# Patient Record
Sex: MALE | Race: BLACK OR AFRICAN AMERICAN
[De-identification: names, ages, dates, MRNs, and addresses within clinical notes are randomized per-mention and may not be internally consistent; named-entity substitution may affect disease eponyms.]

---

## 2020-07-23 ENCOUNTER — HOSPITAL ENCOUNTER (OUTPATIENT)
Dept: HOSPITAL 35 - TBA | Age: 80
Discharge: HOME | End: 2020-07-23
Attending: OPHTHALMOLOGY
Payer: COMMERCIAL

## 2020-07-23 VITALS — SYSTOLIC BLOOD PRESSURE: 145 MMHG | DIASTOLIC BLOOD PRESSURE: 63 MMHG

## 2020-07-23 VITALS — WEIGHT: 217.99 LBS | BODY MASS INDEX: 34.21 KG/M2 | HEIGHT: 67.01 IN

## 2020-07-23 DIAGNOSIS — Z87.891: ICD-10-CM

## 2020-07-23 DIAGNOSIS — Z98.890: ICD-10-CM

## 2020-07-23 DIAGNOSIS — Z11.59: ICD-10-CM

## 2020-07-23 DIAGNOSIS — G47.30: ICD-10-CM

## 2020-07-23 DIAGNOSIS — H05.89: ICD-10-CM

## 2020-07-23 DIAGNOSIS — Z79.899: ICD-10-CM

## 2020-07-23 DIAGNOSIS — E11.9: ICD-10-CM

## 2020-07-23 DIAGNOSIS — D17.0: Primary | ICD-10-CM

## 2020-07-23 PROCEDURE — 50101: CPT

## 2020-07-23 PROCEDURE — 50386 REMOVE STENT VIA TRANSURETH: CPT

## 2020-07-23 PROCEDURE — 62110: CPT

## 2020-07-23 PROCEDURE — 70005: CPT

## 2020-07-23 PROCEDURE — 50398: CPT

## 2020-07-23 PROCEDURE — 51636: CPT

## 2020-07-23 PROCEDURE — 62900: CPT

## 2020-07-23 PROCEDURE — 50010 RENAL EXPLORATION: CPT

## 2020-07-27 NOTE — O
[address] [place]   [address]                     OPERATIVE REPORT              
_______________________________________________________________________________
 
Name:       WILLARD SHEN              Room #:                     DEP [facility]#:      [id number]                       Account #:      [id number]  
Admission:  07/23/20    Attend Phys:    Tapan Davis MD  
Discharge:  07/23/20    Date of Birth:  [birth year]  
                                                          Report #: [id number]
                                                                    [id number]   
_______________________________________________________________________________
THIS REPORT FOR:  
 
cc:  Ciara Rowley MD,Ciara Davis,Tapan CHADWICK MD                                          ~
CC: Raymond Davis
 
DATE OF SERVICE:  07/23/2020
 
 
PREOPERATIVE DIAGNOSIS:  Left orbital mass.
 
POSTOPERATIVE DIAGNOSIS:  Left orbital mass.
 
PROCEDURE:  Left transconjunctival orbitotomy.
 
SURGEON:  Tapan Davis MD.
 
ASSISTANT:  None.
 
ANESTHESIA:  General.
 
COMPLICATIONS:  None.
 
INDICATIONS FOR SURGERY:  This pleasant 79-year-old gentleman has a mass in his
left superotemporal orbit inducing mechanical ectropion with chronic irritation
and discharge from that eye.  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  He
presents today for a right transconjunctival orbitotomy.  Informed consent was
obtained to include but not limited to the potential risk for loss of vision,
bleeding, infection, failure to improve the problem, and the potential need for
further surgery or treatment.
 
DESCRIPTION OF PROCEDURE:  The patient was taken to the operating room where
general anesthesia was administered.  The left orbit was then anesthetized
transconjunctivally with Xylocaine with epinephrine mixed with Marcaine and
Wydase.  An additional aliquot of anesthetic was then administered over the left
lateral canthus to achieve a 7th nerve block.  The patient was subsequently
prepped and draped in the usual sterile fashion.  The left upper lid was then
distracted superiorly allowing access to the superior temporal conjunctivae.  A
radial incision was then made through conjunctiva allowing Tenon to be grasped. 
Tenon's capsule was then elevated and an incision made through the capsule.  The
mass could be grasped through that buttonhole incision.  The dissection was then
 
 
 
[address]   [address]                     OPERATIVE REPORT              
_______________________________________________________________________________
 
Name:       SHENWILLARD              Room #:                     DEP [facility]#:      [id number]                       Account #:      [id number]  
Admission:  07/23/20    Attend Phys:    Tapan Davis MD  
Discharge:  07/23/20    Date of Birth:  [birth year]  
                                                          Report #: [id number]
                                                                    [id number]   
_______________________________________________________________________________
carried down on to the episclera  the lesion from the underlying
globe, the inferiorly based lateral rectus muscle, the superiorly based superior
rectus muscle, and the overlying lacrimal gland.  The dissection was carried
down past the equator.  The lesion was then grasped at its base and clamped with
a hemostat.  A high-temp cautery was then used to amputate the lesion anterior
to the equator.  The stump was grabbed as the hemostat was released to ensure
that good hemostasis was obtained.  Minimal bleeding ensued, which was
controlled with monopolar cautery.  The wound was then closed with 6-0 plain gut
sutures with buried knots.  The wound was then cleaned and dressed with
erythromycin ophthalmic ointment and the patient subsequently transported to the
recovery area having tolerated the procedure well with no anesthetic or
operative complications being noted.
 
 
 
 
 
 
 
 
 
 
 
 
 
 
 
 
 
 
 
 
 
 
 
 
 
 
 
 
 
 
 
 
  <ELECTRONICALLY SIGNED>
   By: Tapan Davis MD          
  07/27/20     0614
D: 07/23/20 0811                           _____________________________________
T: 07/23/20 0826                           Tapan Davis MD            /nt

## 2020-07-28 NOTE — PATH
[facility]
[address]
[place]   [address]                     PATHOLOGY RPT PROCEDURE       
_______________________________________________________________________________
 
Name:       WILLARD SHEN PHLI              Room #:                     DEP [facility] 
M.R.#:      [id number]     Account #:      [id number]  
Admission:  07/23/20    Date of Birth:  [birth year]  
Discharge:  07/23/20                                    Report #:    [id number]
                                                        Path Case #: [id number]
_______________________________________________________________________________
 
[facility] Accession Number: [id number]
.                                                                01
Material submitted:                                        .
eye - LEFT EYE DERMAL LIPOMA. Modifiers: left
.                                                                01
Clinical history:                                          .
Benign neoplasm of orbit
.                                                                02
**********************************************************************
Diagnosis:
Mature adipose tissue, left eye dermal lipoma, excision:
- Compatible with a lipoma showing reactive changes.
.
(IUV:mml; 07/28/2020)
[facility]  07/28/2020  1635 Local
**********************************************************************
.                                                                02
Comment:
Examination shows a few reactive nuclei and mild hyperchromasia. Therefore
a properly-controlled *MDM2 immunohistochemical stain is performed on
block A1 and is shows no nuclear reactivity present supporting the
diagnosis rendered.
.
(IUV:mml; 07/28/2020)
.
.
Professional services performed by [facility] at [facility],
[address] [place] [address] , [place] [address].  *Technical services performed
by [facility], [address] [place] [address], [address], [place] [address].
.                                                                02
Electronically signed:                                     .
Dasia Giron MD, Pathologist
NPI- [id number]
.                                                                01
Gross description:                                         .
The specimen is received in formalin, labeled "Willard Shen, left eye
dermal lipoma" and consists of a segment of yellow orange lobulated
tissue measuring 1.4 x 1.0 x 0.5 cm.  Sectioning reveals homogeneous
yellow cut surfaces and the specimen is entirely submitted in A1.
(SDY; 7/23/2020)
SYU/SYU  07/28/2020  1351 Local
.                                                                02
Pathologist provided ICD-10:
D17.79
.                                                                02
CPT                                                        .
871720, D42708
 
 
[facility]
[address] [place]   [address]                     PATHOLOGY RPT PROCEDURE       
_______________________________________________________________________________
 
Name:       WILLARD SHEN PHIL              Room #:                     [facility] 
M.R.#:      [id number]     Account #:      [id number]  
Admission:  07/23/20    Date of Birth:  [birth year]  
Discharge:  07/23/20                                    Report #:    [id number]
                                                        Path Case #: [id number]
_______________________________________________________________________________
Specimen Comment: A courtesy copy of this report has been sent to [phone]
[phone]
Specimen Comment: [phone], [phone]
Specimen Comment: Report sent to ,DR ANTOINE / DR ZARAGOZA
***Performed at:  01
   [address], [place], [place]  [id number]
   MD Jose Guzman MD Phone:  [phone]
***Performed at:  02
   [address]  [id number]
   MD Dasia Giron MD Phone:  [phone]

## 2022-01-08 ENCOUNTER — HOSPITAL ENCOUNTER (EMERGENCY)
Dept: HOSPITAL 35 - ER | Age: 82
End: 2022-01-08
Payer: COMMERCIAL

## 2022-01-08 ENCOUNTER — HOSPITAL ENCOUNTER (INPATIENT)
Dept: HOSPITAL 35 - SBH | Age: 82
LOS: 3 days | Discharge: HOME | DRG: 885 | End: 2022-01-11
Attending: PSYCHIATRY & NEUROLOGY | Admitting: PSYCHIATRY & NEUROLOGY
Payer: COMMERCIAL

## 2022-01-08 VITALS — HEIGHT: 67 IN | BODY MASS INDEX: 33.74 KG/M2 | WEIGHT: 214.99 LBS

## 2022-01-08 DIAGNOSIS — Z79.82: ICD-10-CM

## 2022-01-08 DIAGNOSIS — Z90.49: ICD-10-CM

## 2022-01-08 DIAGNOSIS — K21.9: ICD-10-CM

## 2022-01-08 DIAGNOSIS — Z87.891: ICD-10-CM

## 2022-01-08 DIAGNOSIS — M54.2: ICD-10-CM

## 2022-01-08 DIAGNOSIS — F32.2: Primary | ICD-10-CM

## 2022-01-08 DIAGNOSIS — E78.00: ICD-10-CM

## 2022-01-08 DIAGNOSIS — Z79.899: ICD-10-CM

## 2022-01-08 DIAGNOSIS — I10: ICD-10-CM

## 2022-01-08 DIAGNOSIS — E11.9: ICD-10-CM

## 2022-01-08 DIAGNOSIS — Z20.822: ICD-10-CM

## 2022-01-08 DIAGNOSIS — Z98.49: ICD-10-CM

## 2022-01-08 DIAGNOSIS — Z79.51: ICD-10-CM

## 2022-01-08 DIAGNOSIS — F32.9: Primary | ICD-10-CM

## 2022-01-08 DIAGNOSIS — G89.29: ICD-10-CM

## 2022-01-08 DIAGNOSIS — I12.9: ICD-10-CM

## 2022-01-08 DIAGNOSIS — E11.22: ICD-10-CM

## 2022-01-08 DIAGNOSIS — N18.9: ICD-10-CM

## 2022-01-08 DIAGNOSIS — R45.851: ICD-10-CM

## 2022-01-08 DIAGNOSIS — N17.9: ICD-10-CM

## 2022-01-08 DIAGNOSIS — N40.0: ICD-10-CM

## 2022-01-08 PROCEDURE — 10880: CPT

## 2022-01-09 VITALS — DIASTOLIC BLOOD PRESSURE: 76 MMHG | SYSTOLIC BLOOD PRESSURE: 138 MMHG

## 2022-01-09 VITALS — SYSTOLIC BLOOD PRESSURE: 137 MMHG | DIASTOLIC BLOOD PRESSURE: 75 MMHG

## 2022-01-09 VITALS — SYSTOLIC BLOOD PRESSURE: 130 MMHG | DIASTOLIC BLOOD PRESSURE: 64 MMHG

## 2022-01-09 VITALS — DIASTOLIC BLOOD PRESSURE: 81 MMHG | SYSTOLIC BLOOD PRESSURE: 145 MMHG

## 2022-01-09 LAB
CHOLEST SERPL-MCNC: 143 MG/DL (ref ?–200)
EST. AVERAGE GLUCOSE BLD GHB EST-MCNC: 186 MG/DL
GLYCOHEMOGLOBIN (HGB A1C): 8.1 % (ref 4.8–5.6)
HDLC SERPL-MCNC: 44 MG/DL (ref 40–?)
LDLC SERPL-MCNC: 69 MG/DL (ref ?–100)
TC:HDL: 3.3 RATIO
TRIGL SERPL-MCNC: 154 MG/DL (ref ?–150)
VLDLC SERPL CALC-MCNC: 31 MG/DL (ref ?–40)

## 2022-01-09 NOTE — NUR
1-9-22 RECEIVED REPORT FROM ED RN BARRIE. PT ARRIVED 0525 AAOX4, VS B/P
145/81, P 80, R 18, T 96.6, 02 % RA RR EVEN AND NONLABORED, LUNGS
CLEAR, HT RR, ABD SOFT/ACTIVE/NONETENDER. PT DENIES SI/HI AND PAIN. PT CALM
AND COOPERATIVE. PT HX HTN, DMII. PT REPORTS THAT HE HAS ALSO HAD THE BOOSTER
FOR COVID. HCP TIM CAN NP CONTACTED. HCP KAYKAY BOB MD CONTACTED AND ORDERS
RECEIVED. PT REQUESTED WE CONTACT HIS WIFE, MESSAGE LEFT FOR ARI THIS AM.

## 2022-01-09 NOTE — NUR
Phone call to patient's daughter, Verónica ([phone]).  Veórnica is
interested in becoming DPOA for her father.  She stated the patient was
diagnosed with having early signs of dementia while at [facility].
They are currently waiting lab results to determine if the patient has a UTI
and prostate cancer.  Verónica states the patient is .  The wife's phone
number is [phone].  Verónica expressed that her mother would like for
Verónica to be the DPOA as with everything going on, it is too overwhelming for
her mother.  The  provided Verónica with her email address.  Verónica states
she will email the DPOA paperwork to the .

## 2022-01-09 NOTE — NUR
RESUMMED CARE FROM OVERNIGHT SHIFT THIS AM, PATIENT ALERT ORIENTED TIMES 4.
PATIENT DENIES SI/HI/AH/VH AT PRESENT; PATIENT STATES HE WOULD NEVER KILL
HIMSELF AND LEAVE HUS FAMILY. PATIENT STATES THE STRESS FROM COVID HAD HIM
THINKING CRAZY. PATIENT DENIES DEPRESS OR ANXIETY AT PRESENT; PATIENTS ABDOMEN
SOFT BOWEL SOUNDS PRESENT. PATIENTS LUNGS CLEAR PATIENT CALM COOPERATIVE
PARTICIPATED IN GROUPS. WILL CONTINUE TO MONITOR PATIENT FOR SAFETY AND
BEHAVIORS.

## 2022-01-10 VITALS — SYSTOLIC BLOOD PRESSURE: 155 MMHG | DIASTOLIC BLOOD PRESSURE: 69 MMHG

## 2022-01-10 VITALS — DIASTOLIC BLOOD PRESSURE: 83 MMHG | SYSTOLIC BLOOD PRESSURE: 130 MMHG

## 2022-01-10 VITALS — SYSTOLIC BLOOD PRESSURE: 134 MMHG | DIASTOLIC BLOOD PRESSURE: 66 MMHG

## 2022-01-10 NOTE — NUR
Assumed pt care this am from overnight shift. Client presented pleasant and
calm during assessment and was oriented 4x. Client denied depression and
anxiety at this time. Client also denied any hallucinations. Client denied any
suicidal and homicidal thoughts as well. Client lung sounds clear. Bowel
sounds present. Last reported bm 1/08/22.
 
During assessment, client presented concerns about his medication and
medication regime. Concerns passed along to hospitalist, and discussed with
CASA Abbott during this shift. Medication changes made by hospitalist to align
with client's current home regime and hospitalist request. Client has been
informed. No further concerns at this time.

## 2022-01-10 NOTE — NUR
1-09-22 CARE TRANSFERRED 1900. LATER PT AAOX4, VSS, RR EVEN AND NONLABORED ON
RA, LUNGS CLEAR, HT RR, ABD SOFT/ACTIVE. PT DENIES SI/HI AND PAIN. PT CALM AND
COOPERATIVE THROUGHOUT NURSING ASSESSMENT. DURING MEDICATION ADMIN PT REPORTED
FEELING DEPRESSED THROUGH THE LAST YEAR DEALING WITH COVID19. PT WILLING TO
TRY THE MEDICATION AND HOPE IT WORKS. PT DENIES NEEDING ANY FURTHER
ASSISTANCE. PT WILL CONTINUE TO BE MONITOR PER [facility] PR[facility]OL.
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
## 2022-01-10 NOTE — NUR
Nutrition: pt admitted with [facility] unit with SI, depression. Hx NIDDM. On oral
med. A1C 8.5, . Pt voices UBW of 215#.  Has had 10# weight loss from
usual, intentionally. Tries to walk after meals and watches what he eats at
home. On 1 oral med for DM. Continue regular diet as long as accuchecks are
controlled. Consider low nutrition risk.

## 2022-01-10 NOTE — NUR
Phone call to patient's daughter, Carrie.  Carrie provided the correct phone
number for her mother, Ana ([phone]).  Carrie continues to be
interested in being the DPOA for the patient.  The  has discussed this with
the patient and the patient is in support of this occurring.
 
Phone call to the patient's wife, Ana - Voice message

## 2022-01-11 VITALS — DIASTOLIC BLOOD PRESSURE: 62 MMHG | SYSTOLIC BLOOD PRESSURE: 137 MMHG

## 2022-01-11 LAB
ANION GAP SERPL CALC-SCNC: 10 MMOL/L (ref 7–16)
BASOPHILS NFR BLD AUTO: 0.5 % (ref 0–2)
BUN SERPL-MCNC: 25 MG/DL (ref 7–18)
CALCIUM SERPL-MCNC: 8.8 MG/DL (ref 8.5–10.1)
CHLORIDE SERPL-SCNC: 106 MMOL/L (ref 98–107)
CO2 SERPL-SCNC: 24 MMOL/L (ref 21–32)
CREAT SERPL-MCNC: 1.3 MG/DL (ref 0.7–1.3)
EOSINOPHIL NFR BLD: 3.4 % (ref 0–3)
ERYTHROCYTE [DISTWIDTH] IN BLOOD BY AUTOMATED COUNT: 14.5 % (ref 10.5–14.5)
GLUCOSE SERPL-MCNC: 136 MG/DL (ref 74–106)
GRANULOCYTES NFR BLD MANUAL: 36.4 % (ref 36–66)
HCT VFR BLD CALC: 40.7 % (ref 42–52)
HGB BLD-MCNC: 13.3 GM/DL (ref 14–18)
LYMPHOCYTES NFR BLD AUTO: 47.8 % (ref 24–44)
MAGNESIUM SERPL-MCNC: 1.9 MG/DL (ref 1.8–2.4)
MCH RBC QN AUTO: 28.3 PG (ref 26–34)
MCHC RBC AUTO-ENTMCNC: 32.7 G/DL (ref 28–37)
MCV RBC: 86.6 FL (ref 80–100)
MONOCYTES NFR BLD: 11.9 % (ref 1–8)
NEUTROPHILS # BLD: 2 THOU/UL (ref 1.4–8.2)
PLATELET # BLD: 225 THOU/UL (ref 150–400)
POTASSIUM SERPL-SCNC: 4.3 MMOL/L (ref 3.5–5.1)
RBC # BLD AUTO: 4.7 MIL/UL (ref 4.5–6)
SODIUM SERPL-SCNC: 140 MMOL/L (ref 136–145)
VIT B12 SERPL-MCNC: 1249 PG/ML (ref 193–986)
WBC # BLD AUTO: 5.6 THOU/UL (ref 4–11)

## 2022-01-11 NOTE — NUR
PATIENT WAS IN BED IN HIS ROOM THIS EVENING WHEN I ASSUMED CARE OF PATIENT AT
1900. ENCOURAGED PATIENT TO COME OUT AND WATCH [employer] BASKETBALL WITH ANOTHER
PATIENT THAT HE LIKES TO SPEND TIME WITH. HE WAS ANXIOUS AND DID NOT WANT TO
COME OUT IF ALOT OF PEOPLE WERE OUT THERE. HE DID WANT A SNACK, HOWEVER, AND I
EXPLAINED THAT HE HAS TO EAT HIS SNACKS IN THE DINING ROOM. HE CAME OUT AND
WATCHED THE GAME FOR AWHILE BEFORE GOING TO HIS ROOM TO SLEEP. HE DID REQUEST
HIS TRAZADONE PRN AT BEDTIME. HE HAS BEEN PLEASANT AND COOPERATIVE. HE DENIES
SI/HI/AVH. HE DENIES PAIN. BED IN LOW POSITION. PATIENT A/0X4. ROUTINE ROUNDS
TO ASSESS SAFETY AND STATUS OF PATIENT.

## 2022-01-11 NOTE — NUR
PATIENT CARE ASSUMED AT 0700 - PLEASANT AND COOPERATIVE. ALERT AND ORIENTED X
4. ADMITTED HAS BEEN SUFFERING FROM DEPRESSION BUT HOPING MEDICATION WILL
HELP. DENIES S/I OR H/I - RATED DEPRESSION/ANXIETY 5/10 - AMBULATORY -
COMPLIANT WITH MEDICATIONS. BLOOD SUGAR 148 AT BREAKFAST AND  PERCENT
OF HIS MEAL. ISOLATES TO ROOM BUT RESPONSIVE WITH ENCOURAGEMENT. TALKED ABOUT
DR. BOB AND HOW PLEASED HE WAS WITH HER CARE AND DR. MIGUEL. WILL CONTINUE
TO MONITOR FOR SAFETY AND ADDRESS ANY CONCERNS ACCORDINGLY DURING THE DAY.

## 2022-01-11 NOTE — NUR
PATIENT DEPARTED AT 1500 - DISCHARGED HOME.  WIFE TRANSPORTED PRIVATE VEHICLE.
ALL BELONGINGS REVIEWED WITH PATIENT AND TAKEN WITH HIM. STOPPED AT SECURITY
AND CELL PHONE, INSURANCE CARD AND DRIVERS LICENSE SIGNED OUT TO PATIENT.
PATIENT ALERT AND ORIENTED - PLEASANT AND APPRECIATIVE OF ALL THE HELP AND
SUPPORT STAFF AT [facility] PROVIDED FOR HIM. ALL DOCUMENTATION PATIENT WAS
WILLING TO SIGN IN CHART. COPIES SUPPLIED TO PATIENT.

## 2022-01-11 NOTE — NUR
Meeting with patient, Dr. Roca and SW prior to Family meeting.  Doctor and
SW expressed the importance of the patient following the guidelines for being
on the sexual offender registry.
 
Family meeting via phone - Present for meeting were SW, Dr. Roca, patient
and patient's wife (Ana - [phone]).
Ana expressed wanting patient to return home.
Ana does work and will need to return to work.  Dr. Roca encouraged
family to have activities to fill the patient's day.  The patient reports he
does facilitate senior activities.  In addition to this, the patient's
daughter has a dog that he would be able to watch.  Ana reports all guns have
been removed from the home.  The patient will be discharged today.  Ana
reports her daughter will have to pick the patient up as Ana has pink eye and
does not want to risk anyone catching it at the hospital.
 
Phone call to patient's PCP, Dr. Rowley ([phone]).  Discussed
treatment and plan.  Attempted to schedule appointment but the doctor reported
the SW would need to call back to do so.
 
Phone call to patient's therapist, Jair Winkler ([phone]).  Voice
message regarding making an appointment for the patient.
 
Phone call to Dr. Rowley to schedule an appointment for the patient.
First appointment opening is 3/4/22 at 3:30pm.  The SW was transferred to the
nurse to determine if an earlier appointment could be scheduled.  Voice
message.
 
Completed DPOA paperwork with patient.

## 2022-01-12 NOTE — NUR
1/11/2022 and 1/12/2022 - Faxed discharge paperwork to on-going therapist,
Jair Winkler ([phone]).
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
